# Patient Record
Sex: MALE | Race: WHITE
[De-identification: names, ages, dates, MRNs, and addresses within clinical notes are randomized per-mention and may not be internally consistent; named-entity substitution may affect disease eponyms.]

---

## 2017-06-23 ENCOUNTER — HOSPITAL ENCOUNTER (OUTPATIENT)
Dept: HOSPITAL 47 - ORWHC2ENDO | Age: 62
Discharge: HOME | End: 2017-06-23
Payer: COMMERCIAL

## 2017-06-23 VITALS — BODY MASS INDEX: 21.7 KG/M2

## 2017-06-23 VITALS — SYSTOLIC BLOOD PRESSURE: 105 MMHG | DIASTOLIC BLOOD PRESSURE: 69 MMHG

## 2017-06-23 VITALS — HEART RATE: 75 BPM | RESPIRATION RATE: 18 BRPM

## 2017-06-23 VITALS — TEMPERATURE: 97.7 F

## 2017-06-23 DIAGNOSIS — K31.7: Primary | ICD-10-CM

## 2017-06-23 DIAGNOSIS — K44.9: ICD-10-CM

## 2017-06-23 DIAGNOSIS — K29.70: ICD-10-CM

## 2017-06-23 DIAGNOSIS — Z79.899: ICD-10-CM

## 2017-06-23 DIAGNOSIS — K21.0: ICD-10-CM

## 2017-06-23 PROCEDURE — 88342 IMHCHEM/IMCYTCHM 1ST ANTB: CPT

## 2017-06-23 PROCEDURE — 43239 EGD BIOPSY SINGLE/MULTIPLE: CPT

## 2017-06-23 PROCEDURE — 88305 TISSUE EXAM BY PATHOLOGIST: CPT

## 2017-06-23 NOTE — P.PCN
Date of Procedure: 06/23/17


Preoperative Diagnosis: 





Postoperative Diagnosis: 





Procedure(s) Performed: 


BRIEF HISTORY: Patient is a 61-year-old, pleasant, white male, scheduled for an 

upper endoscopy as part of evaluation of intermittent episodes of nausea 

vomiting for the last 2 weeks duration and also had 1 episode of coffee-ground 

emesis.  He is hence scheduled for an upper endoscopy to evaluate further.. 





PROCEDURE PERFORMED: Esophagogastroduodenoscopy with biopsy.





PREOPERATIVE DIAGNOSIS: Intermittent episodes of nausea vomiting and coffee-

ground emesis. 





IV sedation per anesthesia. 





PROCEDURE: After informed consent was obtained, the patient  was brought into 

the endoscopy unit. IV sedation was administered by Anesthesia under continuous 

monitoring. Initially the Olympus GIF-140 video endoscope was inserted into the 

mouth. Esophagus intubated without any difficulty. It was gradually advanced 

into the stomach and duodenum and carefully examined. The bulb and the second 

part of the duodenum appeared normal. The scope at this time was withdrawn to 

the stomach, adequately insufflated with air, and upon careful examination, 

mucosa of the antrum, had mild gastritis and biopsies were done from this area.

  In the proximal body of the stomach there was a 5 mm polyp that was removed 

by biopsy.  The rest of the body, cardia and the fundus appeared normal. The 

scope was then withdrawn into the esophagus.  Small hiatal hernia noted.  The 

GE junction was located at 39 cm from the incisors. The esophagus appeared 

normal. There were 2 superficial erosions noted at the GE junction consistent 

with LA grade B reflux esophagitis and the patient tolerated the procedure 

well. 





IMPRESSION: 


1.  LA grade B reflux esophagitis.


2.  Small hiatal hernia.


3.  5 mm gastric polyps status post biopsy


4.  Mild antral gastritis.





RECOMMENDATIONS: The findings of this examination were discussed with the 

patient as well as his family.  He was advised to follow with the biopsy 

results.  In the meantime he will continue with Protonix 40 mg daily and follow 

antireflux measures. 





Implants: 





Indications for Procedure: 





Operative Findings: 





Description of Procedure:

## 2019-04-01 ENCOUNTER — HOSPITAL ENCOUNTER (OUTPATIENT)
Dept: HOSPITAL 47 - LABWHC1 | Age: 64
Discharge: HOME | End: 2019-04-01
Payer: COMMERCIAL

## 2019-04-01 DIAGNOSIS — Z00.00: Primary | ICD-10-CM

## 2019-04-01 DIAGNOSIS — Z13.220: ICD-10-CM

## 2019-04-01 DIAGNOSIS — Z12.5: ICD-10-CM

## 2019-04-01 DIAGNOSIS — R53.83: ICD-10-CM

## 2019-04-01 LAB
ALBUMIN SERPL-MCNC: 4.4 G/DL (ref 3.8–4.9)
ALBUMIN/GLOB SERPL: 2.1 G/DL (ref 1.6–3.17)
ALP SERPL-CCNC: 75 U/L (ref 41–126)
ALT SERPL-CCNC: 26 U/L (ref 10–49)
ANION GAP SERPL CALC-SCNC: 5.4 MMOL/L (ref 4–12)
AST SERPL-CCNC: 22 U/L (ref 14–35)
BUN SERPL-SCNC: 21 MG/DL (ref 9–27)
CALCIUM SPEC-MCNC: 9.1 MG/DL (ref 8.7–10.3)
CHLORIDE SERPL-SCNC: 107 MMOL/L (ref 96–109)
CHOLEST SERPL-MCNC: 210 MG/DL (ref 0–200)
CO2 SERPL-SCNC: 28.6 MMOL/L (ref 21.6–31.8)
ERYTHROCYTE [DISTWIDTH] IN BLOOD BY AUTOMATED COUNT: 5.24 M/UL (ref 4.3–5.9)
ERYTHROCYTE [DISTWIDTH] IN BLOOD: 13.2 % (ref 11.5–15.5)
GLOBULIN SER CALC-MCNC: 2.1 G/DL (ref 1.6–3.3)
GLUCOSE SERPL-MCNC: 90 MG/DL (ref 70–110)
HCT VFR BLD AUTO: 47.4 % (ref 39–53)
HDLC SERPL-MCNC: 61 MG/DL (ref 40–60)
HGB BLD-MCNC: 15.6 GM/DL (ref 13–17.5)
LDLC SERPL CALC-MCNC: 130.2 MG/DL (ref 0–131)
MCH RBC QN AUTO: 29.7 PG (ref 25–35)
MCHC RBC AUTO-ENTMCNC: 32.8 G/DL (ref 31–37)
MCV RBC AUTO: 90.6 FL (ref 80–100)
PLATELET # BLD AUTO: 197 K/UL (ref 150–450)
POTASSIUM SERPL-SCNC: 4.9 MMOL/L (ref 3.5–5.5)
PROT SERPL-MCNC: 6.5 G/DL (ref 6.2–8.2)
SODIUM SERPL-SCNC: 141 MMOL/L (ref 135–145)
T4 FREE SERPL-MCNC: 0.9 NG/DL (ref 0.8–1.8)
TRIGL SERPL-MCNC: 94 MG/DL (ref 0–149)
VLDLC SERPL CALC-MCNC: 18.8 MG/DL (ref 5–40)
WBC # BLD AUTO: 4.3 K/UL (ref 3.8–10.6)

## 2019-04-01 PROCEDURE — 36415 COLL VENOUS BLD VENIPUNCTURE: CPT

## 2019-04-01 PROCEDURE — 80053 COMPREHEN METABOLIC PANEL: CPT

## 2019-04-01 PROCEDURE — 80061 LIPID PANEL: CPT

## 2019-04-01 PROCEDURE — 84439 ASSAY OF FREE THYROXINE: CPT

## 2019-04-01 PROCEDURE — 82306 VITAMIN D 25 HYDROXY: CPT

## 2019-04-01 PROCEDURE — 85027 COMPLETE CBC AUTOMATED: CPT

## 2022-03-11 ENCOUNTER — HOSPITAL ENCOUNTER (OUTPATIENT)
Dept: HOSPITAL 47 - ORWHC2ENDO | Age: 67
Discharge: HOME | End: 2022-03-11
Attending: INTERNAL MEDICINE
Payer: COMMERCIAL

## 2022-03-11 VITALS — TEMPERATURE: 96.8 F

## 2022-03-11 VITALS — RESPIRATION RATE: 16 BRPM

## 2022-03-11 VITALS — BODY MASS INDEX: 23 KG/M2

## 2022-03-11 VITALS — DIASTOLIC BLOOD PRESSURE: 80 MMHG | HEART RATE: 76 BPM | SYSTOLIC BLOOD PRESSURE: 119 MMHG

## 2022-03-11 DIAGNOSIS — K29.50: ICD-10-CM

## 2022-03-11 DIAGNOSIS — K63.5: ICD-10-CM

## 2022-03-11 DIAGNOSIS — Z79.899: ICD-10-CM

## 2022-03-11 DIAGNOSIS — D12.5: ICD-10-CM

## 2022-03-11 DIAGNOSIS — E78.5: ICD-10-CM

## 2022-03-11 DIAGNOSIS — K22.70: ICD-10-CM

## 2022-03-11 DIAGNOSIS — K21.9: ICD-10-CM

## 2022-03-11 DIAGNOSIS — Z12.11: Primary | ICD-10-CM

## 2022-03-11 PROCEDURE — 43239 EGD BIOPSY SINGLE/MULTIPLE: CPT

## 2022-03-11 PROCEDURE — 45385 COLONOSCOPY W/LESION REMOVAL: CPT

## 2022-03-11 PROCEDURE — 88305 TISSUE EXAM BY PATHOLOGIST: CPT

## 2022-03-11 NOTE — P.PCN
Date of Procedure: 03/11/22


Procedure(s) Performed: 


Brief history:


Patient is a pleasant 66-year-old white male scheduled for an elective upper 

endoscopy as well as colonoscopy as a part of evaluation of GERD and screening 

for colon cancer





Procedure performed:


Esophagogastroduodenoscopy with biopsy


Colonoscopy with snare polypectomy





Preoperative diagnosis:


GERD


Screening for colon cancer





Anesthesia: MAC





Procedure:


After informed consent was obtained from the patient  was brought into the 

endoscopy unit and IV  sedation was administered by anesthesia under continuous 

monitoring.  Initially upper endoscopy was done.  The Olympus  video 

endoscope was inserted inserted into the mouth and esophagus intubated without 

any difficulty and was gradually advanced into the stomach and duodenum and 

carefully examined.  The bulb and second part of the duodenum appeared normal.  

The scope was then withdrawn into the stomach adequately insufflated with air 

and upon careful examination  the antrum had mild gastritis and biopsies were 

done from this area.  The body, cardia and fundus appeared normal.  The scope 

was then withdrawn into the esophagus.  The GE junction was located at 44 cm to 

the incisors. there was a short segment of Patterson's esophagus extending 3 mm 

millimeters proximal to the GE junction which was biopsied.    The GE 

junctionappeared regular with no erythema erosions or ulcerations.  Rest of the 

esophagus appeared normal.  Patient tolerated the procedure well.





At this time the patient continued to remain sedation.  Initial digital rectal 

examination was normal.  Olympus  video colonoscope was then inserted into

the rectum and gradually advanced to the cecum without any difficulty.  Careful 

examination was performed as the scope was gradually being withdrawn.  The prep 

was excellent.  The cecum, had a 1 cm polyp that was removed by snare 

polypectomy.  The ascending colon, transverse colon, descending colon, sigmoid 

colon and rectum appeared normal.  The sigmoid: There was a 3 mm polyp removed 

by snare polypectomy.  Retroflexion was performed in the rectum and no lesions 

were noted.  Patient tolerated the procedure well.





Impression:


1.  Upper endoscopy revealed mild antral gastritis and short segment Patterson's 

esophagus


2.  Colonoscopy revealed 1 cm cecal polyp status post polypectomy and a 3 mm 

sigmoid colon polyp status post polypectomy








Recommendations:


Findings of this examination were discussed with the patient as well as and his 

family.  He was advised to follow with the biopsy results.  If the biopsy 

confirms the presence of Patterson's esophagus he can have a repeat upper 

endoscopy in 3 years.  And have a repeat colonoscopy in 3 years.

## 2023-07-13 ENCOUNTER — HOSPITAL ENCOUNTER (OUTPATIENT)
Dept: HOSPITAL 47 - RADMRIMAIN | Age: 68
Discharge: HOME | End: 2023-07-13
Attending: INTERNAL MEDICINE
Payer: MEDICARE

## 2023-07-13 ENCOUNTER — HOSPITAL ENCOUNTER (OUTPATIENT)
Dept: HOSPITAL 47 - RADUSWWP | Age: 68
Discharge: HOME | End: 2023-07-13
Attending: INTERNAL MEDICINE
Payer: MEDICARE

## 2023-07-13 DIAGNOSIS — I65.23: Primary | ICD-10-CM

## 2023-07-13 DIAGNOSIS — R25.1: ICD-10-CM

## 2023-07-13 DIAGNOSIS — I67.82: ICD-10-CM

## 2023-07-13 DIAGNOSIS — G31.1: Primary | ICD-10-CM

## 2023-07-13 PROCEDURE — 70553 MRI BRAIN STEM W/O & W/DYE: CPT

## 2023-07-13 PROCEDURE — 93880 EXTRACRANIAL BILAT STUDY: CPT

## 2023-07-13 NOTE — MR
EXAMINATION TYPE: MR brain wo/w con

 

DATE OF EXAM: 7/13/2023 2:55 PM

 

COMPARISON: NONE

 

HISTORY: Hand eye coordination trouble, tremor

 

CONTRAST: Patient received 7.5 mL intravenous Gadavist gadolinium contrast.  

 

Multiplanar and multispin-echo imaging of the brain was performed .  Pre and post contrast enhanced i
mages are obtained.

 

The ventricles, basal cisterns and sulci overlying the cerebral convexities are mildly enlarged.  

 

There is evidence of mild periventricular white matter ischemic demyelination.  

 

Remote deep white matter insults are also noted.  

 

No acute edema is seen on diffusion weighted  imaging.   

 

There is no evidence for midline shift or mass effect.  

 

Acute intracranial hemorrhage or extra-axial collection is not evident.   

 

No enhancing lesions are seen.  

 

The paranasal sinuses and mastoid air cells are well-aerated.

 

IMPRESSION:    

 

Age-related atrophic and chronic small vessel ischemic change.  No acute intracranial process at this
 time.  

No enhancing lesions are seen.

## 2023-07-13 NOTE — US
EXAMINATION TYPE: US carotid duplex BILAT

 

DATE OF EXAM: 7/13/2023

 

COMPARISON: NONE

 

CLINICAL INDICATION: Male, 67 years old with history of I65.23 CAROTID STENOSIS; Hand eye coordinatio
n feels off, no h/o stroke

 

TECHNIQUE: Carotid duplex ultrasound examination. Indirect Doppler criteria was utilized.

 

FINDINGS:

 

EXAM MEASUREMENTS: 

 

RIGHT:  Peak Systolic Velocity (PSV) cm/sec

----- Right CCA:  85.1  

----- Right ICA:  120.0     

----- Right ECA:  125.0   

ICA/CCA ratio:  1.4    

 

RIGHT:  End Diastole cm/sec

----- Right CCA:  24.0   

----- Right ICA:  43.2      

----- Right ECA:  22.7     

 

LEFT:  Peak Systolic Velocity (PSV) cm/sec

----- Left CCA:  83.8  

----- Left ICA:  151.0   

----- Left ECA:  97.6  

ICA/CCA ratio:  1.8  

 

LEFT:  End Diastole cm/sec

----- Left CCA:  20.1  

----- Left ICA:  44.3   

----- Left ECA:  11.5 

 

VERTEBRALS (direction of flow):

Right Vertebral: Antegrade

Left Vertebral: Antegrade

 

Rhythm:  Normal

 

SONOGRAPHER NOTES: Mild homogeneous plaque with no stenosis seen

 

 

 

IMPRESSION:  

Mildly elevated velocities within the left ICA but without any narrowing on grayscale images. Correla
te to exclude any underlying hypertension. There may be some component of turbulent vascular flow rat
her than a true arterial stenosis.

 

 

 

 

 

 

Criteria for Assigning % of Stenosis / Diameter reduction

(Estimation based on the indirect measurements of the internal carotid artery velocities (ICA PSV).

1.  Normal (no stenosis)=ICA PSV < 125 cm/s: ratio < 2.0: ICA EDV<40 cm/s.

2. Less than 50% stenosis=ICA PSV < 125 cm/s: ratio < 2.0: ICA EDV<40 cm/s.

3.  50 to 69% stenosis=ICA PSV of 125 to 230 cm/s: ration 2.0 ? 4.0: ICA EDV  cm/s.

4.  Greater than 70% stenosis to near occlusion= ICA PSV > 230 cm/s: ratio > 4.0: ICA EDV > 100 cm/s.
 

5.  Near occlusion= ICA PSV velocities may be low or undetectable: variable ratio and ICA EDV.

6.  Total occlusion=unable to detect flow.

## 2024-01-30 ENCOUNTER — HOSPITAL ENCOUNTER (OUTPATIENT)
Dept: HOSPITAL 47 - RADCTMAIN | Age: 69
Discharge: HOME | End: 2024-01-30
Attending: INTERNAL MEDICINE
Payer: MEDICARE

## 2024-01-30 DIAGNOSIS — I65.22: Primary | ICD-10-CM

## 2024-01-30 DIAGNOSIS — R93.89: ICD-10-CM

## 2024-01-30 LAB — BUN SERPL-SCNC: 24 MG/DL (ref 9–20)

## 2024-01-30 PROCEDURE — 70498 CT ANGIOGRAPHY NECK: CPT

## 2024-01-30 PROCEDURE — 36415 COLL VENOUS BLD VENIPUNCTURE: CPT

## 2024-01-30 PROCEDURE — 82565 ASSAY OF CREATININE: CPT

## 2024-01-30 PROCEDURE — 84520 ASSAY OF UREA NITROGEN: CPT

## 2024-01-30 NOTE — CT
EXAMINATION TYPE: CT angio neck

 

DATE OF EXAM: 1/30/2024

 

HISTORY: abnormal US, carotid stenosis

 

COMPARISON: 7/13/2023 ultrasound

 

CT DLP: 270.5 mGycm.  Automated Exposure Control for Dose Reduction was Utilized.

 

TECHNIQUE:  CTA scan of the neck is performed with IV Contrast, patient injected with 65 mL of Isovue
 370, axial images are obtained, coronal and sagittal reformatted images are reviewed. Three-D recons
tructed images are created on an independent workstation and reviewed.  Source images are reviewed.

 

FINDINGS:

 

Carotid/Vascular Structures: There is a 3 vessel arch.

Common carotid arteries bifurcate into internal and external carotid arteries without significant esau
w limiting stenosis. Minimal plaquing is present at the bifurcations.

Vertebral arteries are codominant.

Internal carotid arteries and vertebral arteries are patent to the skull base.  

 

Current CTA through the carotid bifurcations without significant flow-limiting stenosis appears less 
then what is suggested by the prior ultrasound.

 

 

IMPRESSION:

1. No significant flow-limiting stenosis bilateral carotid bifurcations. Minimal plaquing is at the l
eft internal carotid artery origin.

 

NASCET criteria was used in interpretation of this exam?

## 2024-10-01 ENCOUNTER — HOSPITAL ENCOUNTER (OUTPATIENT)
Dept: HOSPITAL 47 - RADUSWWP | Age: 69
Discharge: HOME | End: 2024-10-01
Attending: INTERNAL MEDICINE
Payer: MEDICARE

## 2024-10-01 PROCEDURE — 76705 ECHO EXAM OF ABDOMEN: CPT

## 2024-10-01 NOTE — US
EXAMINATION TYPE: US liver

 

DATE OF EXAM:   10/1/2024

 

COMPARISON: NONE

 

CLINICAL INDICATION: Male, 68 years old with history of R17 ELEVATED BILIRUBIN; Elevated bilirubin

 

TECHNIQUE: Grayscale and color Doppler imaging of the right upper quadrant.

 

FINDINGS:

 

EXAM MEASUREMENTS:

 

Liver Length:  15.6 cm   

Gallbladder Wall:  0.25 cm   

CBD:  0.72 cm

Right Kidney:  11.0 x 5.6 x 4.3 cm 

 

SONOGRAPHER NOTES: Exam is limited due to gas

 

Pancreas:  Obscured by bowel gas

Liver:  Appears wnl  

Gallbladder:  **Measures 10.2 cm in length. *Internal echoes seen - question some sludge within

**Evidence for sonographic Blair's sign:  No

CBD:  Dilated 

Right Kidney:  No hydronephrosis or nephrolithiasis.   

 

 

 

IMPRESSION:

1. Gallbladder is distended with suspected gallbladder sludge or tiny gallstones. CBD is slightly dil
ated 7 mm. Distal CBD abnormality not excluded. Correlate clinically.

 

[facility], Workstation: [email or web address], 10/1/2024 11:23 AM

## 2024-12-13 ENCOUNTER — HOSPITAL ENCOUNTER (OUTPATIENT)
Dept: HOSPITAL 47 - RADMRIMAIN | Age: 69
Discharge: HOME | End: 2024-12-13
Attending: SURGERY
Payer: MEDICARE

## 2024-12-13 DIAGNOSIS — K83.8: Primary | ICD-10-CM

## 2024-12-13 PROCEDURE — 74181 MRI ABDOMEN W/O CONTRAST: CPT

## 2024-12-13 NOTE — MR
MRCP: Elevated bilirubin.

 

COMPARISON: None.

 

TECHNIQUE: MRCP was performed according to protocol.

 

FINDINGS:

 

There are no filling defects within the gallbladder, biliary ducts and pancreatic duct.

 

There is no hepatic mass. The pancreas and spleen are unremarkable.

 

The bowel loops are normal.

 

IMPRESSION:

 

Unremarkable MRCP with no definite abnormality of the gallbladder or biliary tree.

 

[facility], Workstation: [facility], 12/13/2024 12:48 PM